# Patient Record
Sex: FEMALE | URBAN - METROPOLITAN AREA
[De-identification: names, ages, dates, MRNs, and addresses within clinical notes are randomized per-mention and may not be internally consistent; named-entity substitution may affect disease eponyms.]

---

## 2018-05-15 ENCOUNTER — TELEPHONE (OUTPATIENT)
Dept: OTHER | Facility: CLINIC | Age: 28
End: 2018-05-15

## 2018-05-15 NOTE — TELEPHONE ENCOUNTER
5/15/2018    Call Regarding Onboarding [facility] OTHER    Attempt 1    Message unable to leave voicemail     Comments: no dep      Outreach   [email or web address]

## 2018-05-31 NOTE — TELEPHONE ENCOUNTER
5/31/2018    Call Regarding Onboarding: [facility]    Attempt 2    Message on voicemail     Comments:   No dep    Outreach   SV